# Patient Record
Sex: MALE
[De-identification: names, ages, dates, MRNs, and addresses within clinical notes are randomized per-mention and may not be internally consistent; named-entity substitution may affect disease eponyms.]

---

## 2022-01-11 ENCOUNTER — NURSE TRIAGE (OUTPATIENT)
Dept: OTHER | Facility: CLINIC | Age: 62
End: 2022-01-11

## 2022-01-11 NOTE — TELEPHONE ENCOUNTER
Subjective: Caller states \"I have not been exposed to Covid - I had a Covid injection on 12/28\"     Current Symptoms: runny nose, cough, post nasal drainage, chest feels heavy - sometimes harder to breathe when coughing, feel rundown, sinus pressure    Onset: 10 days ago; gradual    Associated Symptoms: little to no sleep due to cough    Pain Severity: mild pain from coughing and some pain from sinus pressure    Temperature: No fever     What has been tried: Mucinex, antihistamine, Aleve    LMP: NA Pregnant: No    Recommended disposition: be seen in the next 24 hours    Care advice provided, patient verbalizes understanding; denies any other questions or concerns; instructed to call back for any new or worsening symptoms. Patient/caller to follow-up with PCP     This triage is a result of a call to [address] [place] [address]. Please do not respond to the triage nurse through this encounter. Any subsequent communication should be directly with the patient.       Reason for Disposition   [1] Continuous (nonstop) coughing interferes with work or school AND [2] no improvement using cough treatment per Care Advice    Protocols used: CORONAVIRUS (COVID-19) DIAGNOSED OR SUSPECTED-ADULT-